# Patient Record
Sex: FEMALE | Race: WHITE | Employment: FULL TIME | URBAN - METROPOLITAN AREA
[De-identification: names, ages, dates, MRNs, and addresses within clinical notes are randomized per-mention and may not be internally consistent; named-entity substitution may affect disease eponyms.]

---

## 2024-04-19 ENCOUNTER — OFFICE VISIT (OUTPATIENT)
Dept: OBGYN CLINIC | Facility: CLINIC | Age: 49
End: 2024-04-19
Payer: COMMERCIAL

## 2024-04-19 VITALS
WEIGHT: 165.2 LBS | HEIGHT: 63 IN | SYSTOLIC BLOOD PRESSURE: 114 MMHG | BODY MASS INDEX: 29.27 KG/M2 | DIASTOLIC BLOOD PRESSURE: 68 MMHG

## 2024-04-19 DIAGNOSIS — Z12.4 SCREENING FOR CERVICAL CANCER: ICD-10-CM

## 2024-04-19 DIAGNOSIS — Z01.411 ENCNTR FOR GYN EXAM (GENERAL) (ROUTINE) W ABNORMAL FINDINGS: Primary | ICD-10-CM

## 2024-04-19 DIAGNOSIS — N92.1 MENORRHAGIA WITH IRREGULAR CYCLE: ICD-10-CM

## 2024-04-19 DIAGNOSIS — D25.9 UTERINE LEIOMYOMA, UNSPECIFIED LOCATION: ICD-10-CM

## 2024-04-19 DIAGNOSIS — N92.6 IRREGULAR MENSES: ICD-10-CM

## 2024-04-19 DIAGNOSIS — Z12.31 ENCOUNTER FOR SCREENING MAMMOGRAM FOR MALIGNANT NEOPLASM OF BREAST: ICD-10-CM

## 2024-04-19 PROCEDURE — S0610 ANNUAL GYNECOLOGICAL EXAMINA: HCPCS | Performed by: OBSTETRICS & GYNECOLOGY

## 2024-04-19 PROCEDURE — 99213 OFFICE O/P EST LOW 20 MIN: CPT | Performed by: OBSTETRICS & GYNECOLOGY

## 2024-04-19 RX ORDER — SACCHAROMYCES BOULARDII 250 MG
250 CAPSULE ORAL 2 TIMES DAILY
COMMUNITY

## 2024-04-19 RX ORDER — PARSLEY 450 MG
CAPSULE ORAL
COMMUNITY

## 2024-04-19 NOTE — PROGRESS NOTES
Annual Wellness Visit  [facility] - [address], [address], [place] [address]    ASSESSMENT/PLAN: Bette Villalobos is a 48 y.o.  who presents for annual gynecologic exam.    Encounter for routine gynecologic examination  - Routine well woman exam completed today.  - Cervical Cancer Screening: Current ASCCP Guidelines reviewed. Last Pap: Not on file.  Next Pap Due: today.  - STI screening offered including HIV testing: offered, pt declined  - Contraceptive counseling discussed.  Current contraception: no method  - Breast Cancer Screening: Last Mammogram Not on file, ordered today  - The following were reviewed in today's visit: breast self exam and menopause    Additional problems addressed during this visit:  1. Encntr for gyn exam (general) (routine) w abnormal findings  -     IGP, Aptima HPV, Rfx 16/18,45    2. Encounter for screening mammogram for malignant neoplasm of breast  -     Mammo screening bilateral w 3d & cad; Future    3. Screening for cervical cancer  -     IGP, Aptima HPV, Rfx 16/18,45    4. Irregular menses         -  Advised patient to keep menstrual calendar and inform if bleeding <21 days apart or bleeding lasts > 7 days         -  management with contraceptives reviewed    5. Uterine leiomyoma, unspecified location  -    Pelvic U/S ordered to f/u on myomo  -   US pelvis complete w transvaginal; Future; Expected date: 2024    6. Menorrhagia with irregular cycle  -  Management of heavy flow with Motrin 600 mg or Lysteda 650 mg reviewed.  -     T4, free; Future  -     CBC and differential; Future  -     TSH, 3rd generation; Future  -     Follicle stimulating hormone; Future  -     Prolactin; Future  -     T4, free  -     CBC and differential  -     TSH, 3rd generation  -     Follicle stimulating hormone  -     Prolactin    I explained to Bette Villalobos that due to our extra time and review of a separate problem at her Wellness visit today, her insurance would be  billed for a GYN Wellness visit and a Problem visit.  She may be responsible for a copay for this visit do to the problem component.  She understands and is fine with this.  She appreciated the extra time spent today.    Next visit: 1 yr for WA/  3-4 wks for O.C.      CC:  Annual Gynecologic Examination    HPI: Bette Villalobos is a 48 y.o.  who presents for annual gynecologic examination.  New patient, previous Gyn exam with [facility] in .  Patient states she had her IUD removed in  and has not had any Gyn follow up or mammogram since then.  Patient has not had any follow up with Family Medicine in over 7 years.  Patient with irregular menses since , occurring every 3 to 8 weeks apart, lasting for 5 days with one day being heavy.  Patient desires to know if she is perimenopausal.  Also states she has uterine fibroid(s) diagnosed in .   Has been having Colonoscopy every 5 yrs since age 30 y/o and follows with her Nutritionist regularly        Gyn History  Patient's last menstrual period was 2024.     Last Pap: Not on file was normal    She  reports being sexually active and has had partner(s) who are male. She reports using the following methods of birth control/protection: Abstinence and Condom Male.       OB History      Past Medical History:  : Fibroid     Past Surgical History:  2014: CHOLECYSTECTOMY     Family History   Problem Relation Age of Onset    Diabetes Father     Lung disease Father         Social History     Tobacco Use    Smoking status: Never    Smokeless tobacco: Never   Vaping Use    Vaping status: Never Used   Substance Use Topics    Alcohol use: Yes     Alcohol/week: 4.0 standard drinks of alcohol     Types: 4 Glasses of wine per week    Drug use: Never          Current Outpatient Medications:     Ashwagandha 500 MG CAPS, Take by mouth, Disp: , Rfl:     Calcium Carb-Cholecalciferol (TGT CALCIUM DIETARY SUPPLEMENT PO), Take by mouth, Disp: , Rfl:     Homeopathic  "Products (HOMEOPATHIC CALM SL), Place under the tongue, Disp: , Rfl:     Hormone Cream Base CREA, Use, Disp: , Rfl:     saccharomyces boulardii (FLORASTOR) 250 mg capsule, Take 250 mg by mouth 2 (two) times a day, Disp: , Rfl:     She is allergic to ciprofloxacin and hydrocodone-acetaminophen..    ROS negative except as noted in HPI    Objective:  /68 (BP Location: Left arm, Patient Position: Sitting, Cuff Size: Standard)   Ht 5' 3\" (1.6 m)   Wt 74.9 kg (165 lb 3.2 oz)   LMP 03/08/2024   Breastfeeding No   BMI 29.26 kg/m²      Physical Exam  Vitals and nursing note reviewed.   HENT:      Head: Normocephalic.   Chest:   Breasts:     Breasts are symmetrical.      Right: Normal. No bleeding, mass, nipple discharge, skin change or tenderness.      Left: Normal. No bleeding, mass, nipple discharge, skin change or tenderness.   Abdominal:      General: There is no distension.      Palpations: Abdomen is soft. There is no mass.      Tenderness: There is no abdominal tenderness. There is no rebound.   Genitourinary:     General: Normal vulva.      Exam position: Lithotomy position.      Labia:         Right: No rash, tenderness or lesion.         Left: No rash, tenderness or lesion.       Urethra: No urethral pain or urethral lesion.      Vagina: Normal. No vaginal discharge.      Cervix: No discharge, friability, lesion or erythema.      Uterus: Normal.       Adnexa: Right adnexa normal and left adnexa normal.      Rectum: No external hemorrhoid.   Musculoskeletal:      Right lower leg: No edema.      Left lower leg: No edema.   Lymphadenopathy:      Upper Body:      Right upper body: No axillary or pectoral adenopathy.      Left upper body: No axillary or pectoral adenopathy.   Skin:     General: Skin is warm.   Neurological:      Mental Status: She is alert and oriented to person, place, and time.   Psychiatric:         Mood and Affect: Mood normal.         Behavior: Behavior normal.         Thought Content: " Thought content normal.

## 2024-04-25 LAB
CYTOLOGIST CVX/VAG CYTO: NORMAL
DX ICD CODE: NORMAL
HPV GENOTYPE REFLEX: NORMAL
HPV I/H RISK 4 DNA CVX QL PROBE+SIG AMP: NEGATIVE
Lab: NORMAL
OTHER STN SPEC: NORMAL
PATH REPORT.FINAL DX SPEC: NORMAL
SL AMB NOTE:: NORMAL
SL AMB SPECIMEN ADEQUACY: NORMAL
SL AMB TEST METHODOLOGY: NORMAL

## 2024-05-17 ENCOUNTER — OFFICE VISIT (OUTPATIENT)
Dept: OBGYN CLINIC | Facility: CLINIC | Age: 49
End: 2024-05-17
Payer: COMMERCIAL

## 2024-05-17 VITALS
WEIGHT: 164 LBS | DIASTOLIC BLOOD PRESSURE: 74 MMHG | HEIGHT: 63 IN | SYSTOLIC BLOOD PRESSURE: 112 MMHG | BODY MASS INDEX: 29.06 KG/M2

## 2024-05-17 DIAGNOSIS — D25.1 INTRAMURAL LEIOMYOMA OF UTERUS: ICD-10-CM

## 2024-05-17 DIAGNOSIS — N92.1 MENORRHAGIA WITH IRREGULAR CYCLE: Primary | ICD-10-CM

## 2024-05-17 PROCEDURE — 99214 OFFICE O/P EST MOD 30 MIN: CPT | Performed by: OBSTETRICS & GYNECOLOGY

## 2024-05-17 RX ORDER — TRANEXAMIC ACID 650 MG/1
TABLET ORAL
Qty: 30 TABLET | Refills: 11 | Status: SHIPPED | OUTPATIENT
Start: 2024-05-17

## 2024-05-17 NOTE — PROGRESS NOTES
Assessment/Plan:      Diagnoses and all orders for this visit:    Menorrhagia with irregular cycle  -  patient's lab results reviewed with her and informed hr of results being WNL   -  management with estrogen-progesterone options (OCPs, IUD) reviewed.  Patient declines  -  Plans to use Motrin or Lysteda for management of heavy flow PRN  -  Advised patient to keep menstrual calendar and inform Gyn if bleeding occurs <21 days apart or lasts >6 days  -     Tranexamic Acid 650 MG TABS; Take 2 tabs 3 times a day for 5 day as needed    Intramural leiomyoma of uterus       -- U/S results reviewed with patient.  Informed her of 3 small intramural myomas.  No medical or surgical intervention indicated     Subjective:     Patient ID: Bette Villalobos is a 48 y.o. female.    Patient presents to review lab and U/S results.  Has had menses 3 weeks apart to 2 months apart.  Bleeding lasts for 5 days with 1-2 days being heavy flow.  Patient's menses have leanne managed with OCPs, IUD's since menarche.  Second IUD removed 2020 five years from insert          Review of Systems   Constitutional:  Negative for activity change and unexpected weight change.   Genitourinary:  Negative for pelvic pain, vaginal bleeding, vaginal discharge and vaginal pain.         Objective:     Physical Exam

## 2025-04-25 ENCOUNTER — ANNUAL EXAM (OUTPATIENT)
Dept: OBGYN CLINIC | Facility: CLINIC | Age: 50
End: 2025-04-25
Payer: COMMERCIAL

## 2025-04-25 VITALS
SYSTOLIC BLOOD PRESSURE: 120 MMHG | WEIGHT: 179 LBS | HEIGHT: 63 IN | BODY MASS INDEX: 31.71 KG/M2 | DIASTOLIC BLOOD PRESSURE: 60 MMHG

## 2025-04-25 DIAGNOSIS — Z12.31 ENCOUNTER FOR SCREENING MAMMOGRAM FOR MALIGNANT NEOPLASM OF BREAST: ICD-10-CM

## 2025-04-25 DIAGNOSIS — Z01.419 ENCNTR FOR GYN EXAM (GENERAL) (ROUTINE) W/O ABN FINDINGS: Primary | ICD-10-CM

## 2025-04-25 PROCEDURE — S0612 ANNUAL GYNECOLOGICAL EXAMINA: HCPCS | Performed by: OBSTETRICS & GYNECOLOGY

## 2025-04-25 NOTE — PROGRESS NOTES
Annual Wellness Visit  [facility] - [address], [address], [place] [address]    ASSESSMENT/PLAN: Bette Villalobos is a 49 y.o.  who presents for annual gynecologic exam.    Encounter for routine gynecologic examination  - Routine well woman exam completed today.  - Cervical Cancer Screening: Current ASCCP Guidelines reviewed. Last Pap: 2024. Past abnormal pap: none.  Next Pap Due: .  - STI screening offered including HIV testing: offered, pt declined  - Contraceptive counseling discussed.  Current contraception: condoms  - Breast Cancer Screening: Last Mammogram Not on file, order provided  - The following were reviewed in today's visit: breast self exam, mammography screening ordered, and colonoscopy has been done every 5 years since age 31  Additional problems addressed during this visit:  1. Encntr for gyn exam (general) (routine) w/o abn findings  2. Encounter for screening mammogram for malignant neoplasm of breast  -     Mammo screening bilateral w 3d and cad; Future      Next visit: 1 yr WA      CC:  Annual Gynecologic Examination    HPI: Bette Villalobos is a 49 y.o.  who presents for annual gynecologic examination.  Pt presents for Gyn wellness exam    Gynecologic Exam        Gyn History  Patient's last menstrual period was 2025.     Last Pap: 2024 was normal    She  reports being sexually active and has had partner(s) who are male. She reports using the following methods of birth control/protection: Abstinence and Condom Male.       OB History      Past Medical History:  2009: Fibroid     Past Surgical History:  2014: CHOLECYSTECTOMY     Family History   Problem Relation Age of Onset    Diabetes Father     Lung disease Father         Social History     Tobacco Use    Smoking status: Never    Smokeless tobacco: Never   Vaping Use    Vaping status: Never Used   Substance Use Topics    Alcohol use: Yes     Alcohol/week: 4.0 standard drinks of  "alcohol     Types: 4 Glasses of wine per week    Drug use: Never          Current Outpatient Medications:     Calcium Carb-Cholecalciferol (TGT CALCIUM DIETARY SUPPLEMENT PO), Take by mouth, Disp: , Rfl:     Homeopathic Products (HOMEOPATHIC CALM SL), Place under the tongue, Disp: , Rfl:     Hormone Cream Base CREA, Use, Disp: , Rfl:     saccharomyces boulardii (FLORASTOR) 250 mg capsule, Take 250 mg by mouth 2 (two) times a day, Disp: , Rfl:     She is allergic to ciprofloxacin and hydrocodone-acetaminophen..    ROS negative except as noted in HPI    Objective:  /60 (BP Location: Right arm, Patient Position: Sitting, Cuff Size: Standard)   Ht 5' 3\" (1.6 m)   Wt 81.2 kg (179 lb)   LMP 01/29/2025   BMI 31.71 kg/m²      Physical Exam  Vitals and nursing note reviewed.   HENT:      Head: Normocephalic.   Chest:   Breasts:     Breasts are symmetrical.      Right: Normal. No bleeding, mass, nipple discharge, skin change or tenderness.      Left: Normal. No bleeding, mass, nipple discharge, skin change or tenderness.   Abdominal:      General: There is no distension.      Palpations: Abdomen is soft. There is no mass.      Tenderness: There is no abdominal tenderness. There is no rebound.   Genitourinary:     General: Normal vulva.      Exam position: Lithotomy position.      Labia:         Right: No rash, tenderness or lesion.         Left: No rash, tenderness or lesion.       Urethra: No urethral pain or urethral lesion.      Vagina: Normal. No vaginal discharge.      Cervix: No discharge, friability, lesion or erythema.      Uterus: Normal.       Adnexa: Right adnexa normal and left adnexa normal.      Rectum: No external hemorrhoid.   Musculoskeletal:      Right lower leg: No edema.      Left lower leg: No edema.   Lymphadenopathy:      Upper Body:      Right upper body: No axillary or pectoral adenopathy.      Left upper body: No axillary or pectoral adenopathy.   Skin:     General: Skin is warm. "   Neurological:      Mental Status: She is alert and oriented to person, place, and time.   Psychiatric:         Mood and Affect: Mood normal.         Behavior: Behavior normal.         Thought Content: Thought content normal.

## 2025-05-09 ENCOUNTER — TELEPHONE (OUTPATIENT)
Age: 50
End: 2025-05-09

## 2025-05-09 NOTE — TELEPHONE ENCOUNTER
Patient saw Dr Mace for her annual patient told to repeat pelvic u/s and there are no orders in chart . Patient had last pelvic u/s 1 yr ago at [facility] . Please review and let patient know if the u/s should be repeated  Please print and fax order to [facility] at Fax# [phone]

## 2025-05-11 NOTE — TELEPHONE ENCOUNTER
"Pt had pelvic U/S in 2024, results were reviewed.  Repeat pelvic U/S not indicated one year after previous, unless pt has concerns.  Please review office appointment guidelines with our patient.  Gyn wellness vs problem visits.  Additional \"problems\".  Review office policy and advise to request additional time, instead of 15 minutes for WA so that additional concerns can be addressed or schedule a separate visit for additional concerns.    "